# Patient Record
Sex: FEMALE | Race: BLACK OR AFRICAN AMERICAN | HISPANIC OR LATINO
[De-identification: names, ages, dates, MRNs, and addresses within clinical notes are randomized per-mention and may not be internally consistent; named-entity substitution may affect disease eponyms.]

---

## 2020-08-19 PROBLEM — Z00.00 ENCOUNTER FOR PREVENTIVE HEALTH EXAMINATION: Status: ACTIVE | Noted: 2020-08-19

## 2021-01-22 PROBLEM — M85.80 OSTEOPENIA: Status: ACTIVE | Noted: 2021-01-22

## 2021-01-23 ENCOUNTER — APPOINTMENT (OUTPATIENT)
Dept: ENDOCRINOLOGY | Facility: CLINIC | Age: 71
End: 2021-01-23
Payer: MEDICARE

## 2021-01-23 VITALS
TEMPERATURE: 96.7 F | SYSTOLIC BLOOD PRESSURE: 155 MMHG | WEIGHT: 142.25 LBS | DIASTOLIC BLOOD PRESSURE: 80 MMHG | HEART RATE: 96 BPM | RESPIRATION RATE: 16 BRPM

## 2021-01-23 DIAGNOSIS — M85.80 OTHER SPECIFIED DISORDERS OF BONE DENSITY AND STRUCTURE, UNSPECIFIED SITE: ICD-10-CM

## 2021-01-23 PROCEDURE — 99214 OFFICE O/P EST MOD 30 MIN: CPT

## 2021-01-23 NOTE — ASSESSMENT
[FreeTextEntry1] : Pt. has multinodular goiter. We have reviewed ultrasounds and the discussed the potential for hyperfunction and need to monitor for changes that could be consistent with neoplasm. Will continue to monitor U/S for stability and function.\par Pt. has Impaired fasting glucose and  current HbA1c is increased  from 5.7 to 6.3 which is concerning. We have discussed diet/exercise and risks related to diabetes in great detail.\par Pt. has history of hypertension. Risks related to hypertension including cardiac, renal and vascular fully discussed. Diet discussed as well. Medications and importance of compliance reviewed.recheck was 134/96 to increase ramipril to 5 mg.\par \par

## 2021-08-07 ENCOUNTER — APPOINTMENT (OUTPATIENT)
Dept: ENDOCRINOLOGY | Facility: CLINIC | Age: 71
End: 2021-08-07
Payer: MEDICARE

## 2021-08-07 VITALS
DIASTOLIC BLOOD PRESSURE: 98 MMHG | OXYGEN SATURATION: 98 % | BODY MASS INDEX: 23.55 KG/M2 | HEART RATE: 101 BPM | WEIGHT: 128 LBS | HEIGHT: 62 IN | TEMPERATURE: 97.2 F | SYSTOLIC BLOOD PRESSURE: 160 MMHG

## 2021-08-07 PROCEDURE — 99213 OFFICE O/P EST LOW 20 MIN: CPT

## 2021-08-07 NOTE — ASSESSMENT
[FreeTextEntry1] : Pt. has multinodular goiter. We have reviewed ultrasounds and the discussed the potential for hyperfunction and need to monitor for changes that could be consistent with neoplasm. Will continue to monitor U/S for stability and function.U/S was ordered last visit.TFTs were all in normal range. \par Pt. has Impaired fasting glucose and  current HbA1c is excellent at 5.4 after it had  increased  from 5.7 to 6.3 on prior visit.. We have discussed diet/exercise and risks related to diabetes in great detail.Metformin at  500 mg qd. \par Pt. has history of hypertension. Risks related to hypertension including cardiac, renal and vascular fully discussed. Diet discussed as well. Medications and importance of compliance reviewed.\par Lipid levels were reviewed with patient and importance and function of LDL, HDL and triglycerides discussed. Methods to increase HDL (exercise, fish, beans, oat meal, legumes etc.) discussed with pt. in conjunction with measures to decrease LDL and triglycerides  including diet and exercise.LDL/HDL was 119/65. Triglycerides were 57. .Current regimen of dietary  treatment to continue. Risks/benefits  of statins were discussed in detail. \par \par

## 2022-05-21 ENCOUNTER — APPOINTMENT (OUTPATIENT)
Dept: ENDOCRINOLOGY | Facility: CLINIC | Age: 72
End: 2022-05-21
Payer: MEDICARE

## 2022-05-21 VITALS
SYSTOLIC BLOOD PRESSURE: 140 MMHG | HEIGHT: 62 IN | OXYGEN SATURATION: 98 % | HEART RATE: 100 BPM | TEMPERATURE: 97.2 F | WEIGHT: 125 LBS | DIASTOLIC BLOOD PRESSURE: 90 MMHG | BODY MASS INDEX: 23 KG/M2

## 2022-05-21 PROCEDURE — 99213 OFFICE O/P EST LOW 20 MIN: CPT

## 2022-05-23 NOTE — ASSESSMENT
[FreeTextEntry1] : Pt. has multinodular goiter. We have reviewed ultrasounds and the discussed the potential for hyperfunction and need to monitor for changes that could be consistent with neoplasm. Will continue to monitor U/S for stability and function.U/S was ordered last visit.TFTs were all in normal range. \par Pt. has Impaired fasting glucose and  previous HbA1c was excellent at 5.4 after it had  increased  from 5.7 to 6.3 on prior visits.. We have discussed diet/exercise and risks related to diabetes in great detail.Metformin at  500 mg qd. \par Pt. has history of hypertension. Risks related to hypertension including cardiac, renal and vascular fully discussed. Diet discussed as well. Medications and importance of compliance reviewed.\par Lipid levels were reviewed with patient and importance and function of LDL, HDL and triglycerides discussed. Methods to increase HDL (exercise, fish, beans, oat meal, legumes etc.) discussed with pt. in conjunction with measures to decrease LDL and triglycerides  including diet and exercise.LDL/HDL was  103/62 from 119/65. Triglycerides were 75.Current regimen of dietary  treatment to continue. Risks/benefits  of statins were discussed in detail. \par \par

## 2022-07-09 RX ORDER — BLOOD SUGAR DIAGNOSTIC
STRIP MISCELLANEOUS 4 TIMES DAILY
Qty: 360 | Refills: 3 | Status: ACTIVE | COMMUNITY

## 2022-07-09 RX ORDER — BLOOD-GLUCOSE METER
EACH MISCELLANEOUS
Qty: 100 | Refills: 5 | Status: ACTIVE | COMMUNITY
Start: 1900-01-01 | End: 1900-01-01

## 2023-05-02 ENCOUNTER — RX RENEWAL (OUTPATIENT)
Age: 73
End: 2023-05-02

## 2023-08-26 ENCOUNTER — APPOINTMENT (OUTPATIENT)
Dept: ENDOCRINOLOGY | Facility: CLINIC | Age: 73
End: 2023-08-26
Payer: MEDICARE

## 2023-08-26 PROCEDURE — 99443: CPT | Mod: 95

## 2024-02-25 RX ORDER — RAMIPRIL 5 MG/1
5 CAPSULE ORAL
Qty: 90 | Refills: 3 | Status: ACTIVE | COMMUNITY
Start: 2023-05-02 | End: 1900-01-01

## 2024-02-25 RX ORDER — METFORMIN HYDROCHLORIDE 500 MG/1
500 TABLET, COATED ORAL
Qty: 90 | Refills: 3 | Status: ACTIVE | COMMUNITY
Start: 2023-05-02 | End: 1900-01-01

## 2024-04-12 PROBLEM — E83.52 HYPERCALCEMIA: Status: ACTIVE | Noted: 2024-04-12

## 2024-04-12 PROBLEM — E04.2 MULTIPLE THYROID NODULES: Status: ACTIVE | Noted: 2021-01-22

## 2024-04-12 PROBLEM — R73.01 IMPAIRED FASTING GLUCOSE: Status: ACTIVE | Noted: 2021-01-22

## 2024-04-13 ENCOUNTER — APPOINTMENT (OUTPATIENT)
Dept: ENDOCRINOLOGY | Facility: CLINIC | Age: 74
End: 2024-04-13
Payer: MEDICARE

## 2024-04-13 VITALS
HEART RATE: 72 BPM | OXYGEN SATURATION: 98 % | HEIGHT: 61 IN | DIASTOLIC BLOOD PRESSURE: 72 MMHG | SYSTOLIC BLOOD PRESSURE: 142 MMHG

## 2024-04-13 DIAGNOSIS — R73.01 IMPAIRED FASTING GLUCOSE: ICD-10-CM

## 2024-04-13 DIAGNOSIS — E83.52 HYPERCALCEMIA: ICD-10-CM

## 2024-04-13 DIAGNOSIS — E04.2 NONTOXIC MULTINODULAR GOITER: ICD-10-CM

## 2024-04-13 PROCEDURE — 99214 OFFICE O/P EST MOD 30 MIN: CPT

## 2024-04-13 NOTE — ASSESSMENT
[FreeTextEntry1] : Pt. has multinodular goiter. We have reviewed ultrasounds and the discussed the potential for hyperfunction and need to monitor for changes that could be consistent with neoplasm. Will continue to monitor U/S for stability and function. U/S done 3/23/2023 with RLL 1.4 cm. TFTs were all in normal range.  Pt. has Impaired fasting glucose and HbA1c was 5.8 with past values 5.7 to 6.3 on prior visits. We have discussed diet/exercise and risks related to diabetes in great detail. Metformin at 500 mg qd.will continue.  Pt. has history of hypertension. Risks related to hypertension including cardiac, renal and vascular fully discussed. Diet discussed as well. Medications and importance of compliance reviewed. Lipid levels were reviewed with patient and importance and function of LDL, HDL and triglycerides discussed. Methods to increase HDL (exercise, fish, beans, oatmeal, legumes etc.) discussed with pt. in conjunction with measures to decrease LDL and triglycerides including diet and exercise.LDL/HDL was 111/62 from 103/62 & 119/65. Triglycerides were 63. Current regimen of dietary treatment to continue. Risks/benefits of statins were discussed in detail.  B12 and Vitamin D were fine.  Pt. has Calcium of 10.4. The PTH will be ordered next visit. The pt. has been counseled to take extra fluids daily. Symptoms of hypercalcemia including polydipsia, polyuria, gastrointestinal pains, kidney stones, ulcers, bone pain, weakness and constipation were all reviewed.

## 2025-01-10 ENCOUNTER — NON-APPOINTMENT (OUTPATIENT)
Age: 75
End: 2025-01-10

## 2025-01-10 DIAGNOSIS — Z78.9 OTHER SPECIFIED HEALTH STATUS: ICD-10-CM

## 2025-01-10 DIAGNOSIS — Z87.891 PERSONAL HISTORY OF NICOTINE DEPENDENCE: ICD-10-CM

## 2025-01-10 DIAGNOSIS — E11.9 TYPE 2 DIABETES MELLITUS W/OUT COMPLICATIONS: ICD-10-CM

## 2025-01-10 DIAGNOSIS — I10 ESSENTIAL (PRIMARY) HYPERTENSION: ICD-10-CM

## 2025-01-10 DIAGNOSIS — Z80.3 FAMILY HISTORY OF MALIGNANT NEOPLASM OF BREAST: ICD-10-CM

## 2025-01-10 DIAGNOSIS — Z92.89 PERSONAL HISTORY OF OTHER MEDICAL TREATMENT: ICD-10-CM

## 2025-01-10 DIAGNOSIS — L30.9 DERMATITIS, UNSPECIFIED: ICD-10-CM

## 2025-01-10 DIAGNOSIS — Z98.890 OTHER SPECIFIED POSTPROCEDURAL STATES: ICD-10-CM

## 2025-06-21 ENCOUNTER — APPOINTMENT (OUTPATIENT)
Dept: ENDOCRINOLOGY | Facility: CLINIC | Age: 75
End: 2025-06-21

## 2025-06-21 VITALS
HEIGHT: 61 IN | BODY MASS INDEX: 22.66 KG/M2 | WEIGHT: 120 LBS | DIASTOLIC BLOOD PRESSURE: 78 MMHG | HEART RATE: 88 BPM | SYSTOLIC BLOOD PRESSURE: 163 MMHG | OXYGEN SATURATION: 97 %

## 2025-06-21 VITALS — DIASTOLIC BLOOD PRESSURE: 82 MMHG | SYSTOLIC BLOOD PRESSURE: 132 MMHG

## 2025-06-21 PROCEDURE — 99214 OFFICE O/P EST MOD 30 MIN: CPT
